# Patient Record
Sex: FEMALE
[De-identification: names, ages, dates, MRNs, and addresses within clinical notes are randomized per-mention and may not be internally consistent; named-entity substitution may affect disease eponyms.]

---

## 2021-04-05 PROBLEM — Z00.00 ENCOUNTER FOR PREVENTIVE HEALTH EXAMINATION: Status: ACTIVE | Noted: 2021-04-05

## 2021-04-06 ENCOUNTER — APPOINTMENT (OUTPATIENT)
Dept: ORTHOPEDIC SURGERY | Facility: CLINIC | Age: 76
End: 2021-04-06
Payer: MEDICARE

## 2021-04-06 DIAGNOSIS — M25.562 PAIN IN RIGHT KNEE: ICD-10-CM

## 2021-04-06 DIAGNOSIS — M17.0 BILATERAL PRIMARY OSTEOARTHRITIS OF KNEE: ICD-10-CM

## 2021-04-06 DIAGNOSIS — M25.561 PAIN IN RIGHT KNEE: ICD-10-CM

## 2021-04-06 PROCEDURE — 73564 X-RAY EXAM KNEE 4 OR MORE: CPT | Mod: 50

## 2021-04-06 PROCEDURE — 99203 OFFICE O/P NEW LOW 30 MIN: CPT

## 2021-04-07 PROBLEM — M25.561 PAIN IN BOTH KNEES, UNSPECIFIED CHRONICITY: Status: ACTIVE | Noted: 2021-04-05

## 2021-04-07 NOTE — DISCUSSION/SUMMARY
[de-identified] : This is a 76-year-old female with advanced bilateral knee DJD.  I have explained his condition and its natural history in detail with the patient.  We have gone over the entire spectrum of treatment including NSAIDs, a variety of injections (steroid, hyaluronic acid), physical therapy, avoiding high impact activities, and total knee arthroplasty as a final definitive solution if conservative management fails.  Given that her symptoms are quite mild I have recommended physical therapy and meloxicam to be taken on a as needed basis.  All side effects were reviewed.  She may otherwise follow-up on a as needed basis.

## 2021-04-07 NOTE — DATA REVIEWED
[de-identified] : 4/6/2021–bilateral knee x-rays (AP, lateral, sunrise, Wei): There moderate to severe tricompartmental degenerative changes bilaterally, most prominent in the left medial compartment with near bone-on-bone disease.  No fractures, dislocations, or osseous lesions.

## 2021-04-07 NOTE — HISTORY OF PRESENT ILLNESS
[FreeTextEntry1] : This is a 76F with a history of hypertension, diabetes, hyperlipidemia, status post left hip ORIF (2008),  known bilateral knee DJD, who is presenting for evaluation of several years of bilateral knee pain.  She has had multiple prior knee injections, with a right hyaluronic acid injection in 2008, and bilateral hyaluronic acid injections in 2019, with reported improvements in pain.  Denies any recent trauma.  Currently her pain is 0 out of 10 at rest but worsens up to 2 out of 10 with ambulation (L>R).  She takes Tylenol which has helped.  She has recently felt unsteady and has not been to any physical therapy recently.

## 2021-05-03 ENCOUNTER — RX RENEWAL (OUTPATIENT)
Age: 76
End: 2021-05-03

## 2021-05-26 ENCOUNTER — RX RENEWAL (OUTPATIENT)
Age: 76
End: 2021-05-26

## 2021-06-27 ENCOUNTER — NON-APPOINTMENT (OUTPATIENT)
Age: 76
End: 2021-06-27

## 2021-07-21 ENCOUNTER — RX RENEWAL (OUTPATIENT)
Age: 76
End: 2021-07-21

## 2021-08-11 ENCOUNTER — RX RENEWAL (OUTPATIENT)
Age: 76
End: 2021-08-11

## 2021-08-11 RX ORDER — MELOXICAM 15 MG/1
15 TABLET ORAL
Qty: 30 | Refills: 0 | Status: ACTIVE | COMMUNITY
Start: 2021-06-27 | End: 1900-01-01

## 2022-10-14 ENCOUNTER — APPOINTMENT (OUTPATIENT)
Dept: OPHTHALMOLOGY | Facility: CLINIC | Age: 77
End: 2022-10-14

## 2022-12-21 ENCOUNTER — NON-APPOINTMENT (OUTPATIENT)
Age: 77
End: 2022-12-21

## 2024-02-23 NOTE — PHYSICAL EXAM
Unable to LVM as VMB is full.    [FreeTextEntry1] : General: well nourished, in no acute distress, alert and oriented to person, place and time.\par Psychiatric: normal mood and affect, no abnormal movements or speech patterns.\par Eyes: vision intact without deficits, sclera and conjunctiva were normal, pupils were equal in size. \par ENT: Ears and nose were normal in appearance. No thyromegaly.\par Lymph: no enlarged nodes, no lymphedema in extremity.\par Respiratory: Normal respiratory rhythm and effort. No wheezing detected without auscultation. No shortness of breath or respiratory distress.\par Cardiac: no cardiac related leg swelling.\par Neurology: normal gross sensation in extremities to light touch.\par Abdomen: soft, non-tender, tympanic, no masses.\par \par B/L LE:\par \par Skin CDI. Mild effusion, no swelling, or ecchymosis. \par Left knee ROM: 5-100 degrees. No varus/valgus instability. +lateral joint line TTP. \par Right knee ROM: 0-130 w/o pain. Minimal lateral joint line TTP.\par B/L: No TTP over quadriceps/patellar tendon. No TTP over tibial tubercle or pes insertion. No palpable masses. No lymphedema.\par Neg Lachman. Neg Hailey's. \par Alignment: Slight varus\par EHL/FHL/GS/TA 5/5. S/S/SP/DP/T SILT. Toes warm, BCR. Compartments soft.\par \par